# Patient Record
Sex: FEMALE | Race: WHITE | NOT HISPANIC OR LATINO | Employment: UNEMPLOYED | URBAN - METROPOLITAN AREA
[De-identification: names, ages, dates, MRNs, and addresses within clinical notes are randomized per-mention and may not be internally consistent; named-entity substitution may affect disease eponyms.]

---

## 2024-10-23 ENCOUNTER — APPOINTMENT (OUTPATIENT)
Dept: OBSTETRICS AND GYNECOLOGY | Facility: CLINIC | Age: 49
End: 2024-10-23
Payer: COMMERCIAL

## 2024-10-23 VITALS
SYSTOLIC BLOOD PRESSURE: 113 MMHG | DIASTOLIC BLOOD PRESSURE: 79 MMHG | BODY MASS INDEX: 23.66 KG/M2 | WEIGHT: 147.2 LBS | HEIGHT: 66 IN

## 2024-10-23 DIAGNOSIS — N92.1 BREAKTHROUGH BLEEDING ON BIRTH CONTROL PILLS: Primary | ICD-10-CM

## 2024-10-23 PROCEDURE — 3008F BODY MASS INDEX DOCD: CPT

## 2024-10-23 PROCEDURE — 1036F TOBACCO NON-USER: CPT

## 2024-10-23 PROCEDURE — 99203 OFFICE O/P NEW LOW 30 MIN: CPT

## 2024-10-23 RX ORDER — NORETHINDRONE ACETATE AND ETHINYL ESTRADIOL, ETHINYL ESTRADIOL AND FERROUS FUMARATE 1MG-10(24)
1 KIT ORAL
COMMUNITY
Start: 2024-09-30

## 2024-10-23 NOTE — PROGRESS NOTES
Subjective   Patient ID: Sharron Mixon is a 48 y.o. female who presents for Menstrual Problem.  HPI  Patient here to establish care. Patient was previously getting care through [facility], though reports that she has been unhappy with her care as she felt she was not being fully listened to.     Patient reports last year she began to experience AUB and menorrhagia. Patient had subsequent workup and it was found that she had an endometrial polyp. Patient had d and c and polypectomy with negative pathology results. Once benign finding were noted patient was started on Lo loestrin to control cycles. RX sent 9/30    Patient reported to this office today to address concerns on daily bleeding/spotting since starting OCP three weeks ago.   Review of Systems    Objective   Physical Exam  Constitutional:       Appearance: Normal appearance.   Eyes:      Conjunctiva/sclera: Conjunctivae normal.   Pulmonary:      Effort: Pulmonary effort is normal.   Neurological:      Mental Status: She is alert.   Psychiatric:         Mood and Affect: Mood normal.         Assessment/Plan   Breakthrough bleeding on contraception; discussed with patient that irregular bleeding can occur for the first three months when initiating contraception. Discussed with patient increase risk of VTE with increasing age and estrogen use. Patient made aware that this is not a complete contraindication with MEC of 2.     Discussed with patient other contraceptive options if bleeding does not improve with [facility].     Patient to reach out to this provider if she does not see an improvement in her bleeding after 3 months of Ocp use.          MICHAEL Garzon 10/23/24 5:00 PM

## 2024-10-30 ENCOUNTER — APPOINTMENT (OUTPATIENT)
Dept: PRIMARY CARE | Facility: CLINIC | Age: 49
End: 2024-10-30
Payer: COMMERCIAL

## 2024-10-30 VITALS
DIASTOLIC BLOOD PRESSURE: 60 MMHG | BODY MASS INDEX: 23.63 KG/M2 | HEART RATE: 89 BPM | SYSTOLIC BLOOD PRESSURE: 100 MMHG | HEIGHT: 66 IN | WEIGHT: 147 LBS | OXYGEN SATURATION: 99 %

## 2024-10-30 DIAGNOSIS — Z76.89 ENCOUNTER TO ESTABLISH CARE: ICD-10-CM

## 2024-10-30 DIAGNOSIS — D32.0 INTRACRANIAL MENINGIOMA (MULTI): Primary | ICD-10-CM

## 2024-10-30 DIAGNOSIS — G43.E09 CHRONIC MIGRAINE WITH AURA WITHOUT STATUS MIGRAINOSUS, NOT INTRACTABLE: ICD-10-CM

## 2024-10-30 DIAGNOSIS — N92.6 IRREGULAR MENSTRUAL BLEEDING: ICD-10-CM

## 2024-10-30 DIAGNOSIS — E05.00 GRAVES DISEASE: ICD-10-CM

## 2024-10-30 DIAGNOSIS — R53.83 FATIGUE, UNSPECIFIED TYPE: ICD-10-CM

## 2024-10-30 PROCEDURE — 1036F TOBACCO NON-USER: CPT | Performed by: NURSE PRACTITIONER

## 2024-10-30 PROCEDURE — 99203 OFFICE O/P NEW LOW 30 MIN: CPT | Performed by: NURSE PRACTITIONER

## 2024-10-30 PROCEDURE — 3008F BODY MASS INDEX DOCD: CPT | Performed by: NURSE PRACTITIONER

## 2024-10-30 RX ORDER — METHIMAZOLE 5 MG/1
5 TABLET ORAL DAILY
COMMUNITY

## 2024-10-30 RX ORDER — FEXOFENADINE HCL 60 MG/1
60 TABLET, FILM COATED ORAL DAILY
COMMUNITY

## 2024-10-30 RX ORDER — ONDANSETRON HYDROCHLORIDE 8 MG/1
8 TABLET, FILM COATED ORAL EVERY 8 HOURS PRN
COMMUNITY

## 2024-10-30 RX ORDER — VENLAFAXINE HYDROCHLORIDE 150 MG/1
150 CAPSULE, EXTENDED RELEASE ORAL DAILY
COMMUNITY

## 2024-10-30 RX ORDER — RIZATRIPTAN BENZOATE 10 MG/1
10 TABLET ORAL ONCE AS NEEDED
COMMUNITY

## 2024-10-30 ASSESSMENT — PATIENT HEALTH QUESTIONNAIRE - PHQ9
SUM OF ALL RESPONSES TO PHQ9 QUESTIONS 1 & 2: 0
1. LITTLE INTEREST OR PLEASURE IN DOING THINGS: NOT AT ALL
2. FEELING DOWN, DEPRESSED OR HOPELESS: NOT AT ALL

## 2024-10-30 NOTE — PROGRESS NOTES
Subjective   Patient ID: Sharron Mixon is a 48 y.o. female who presents for Establish Care.    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     Graves disease (1/14/22), h/o primary hyperparathyroidism s/p left inferior parathyroidectomy  5/17/2: Endo Dr Emmett Rivas   Med: MethIMAzole (Tapazole)  Was told to decrease and repeat labs  Discussed fatigue with endo but endo felt related more to perimenopause   Endo Referred [facility] to [facility] 8/24    C/o fatigue:  bad over past 3-4 months   Excessive menstrual bleeding   Just started 2nd month of BC  She is to reach out to gyn if bleeding continues after 3rd month of BC    Past Medical History:   Diagnosis Date    Abnormal uterine bleeding due to endometrial polyp     Personal history of other diseases of the nervous system and sense organs     History of migraine     Past Surgical History:   Procedure Laterality Date    DILATION AND CURETTAGE OF UTERUS      NASAL SEPTUM SURGERY  01/12/2016    Nasal Septal Deviation Repair    PARATHYROIDECTOMY Left 2021    TONSILLECTOMY  01/12/2016    Tonsillectomy     Social History     Socioeconomic History    Marital status:      Spouse name: Not on file    Number of children: Not on file    Years of education: Not on file    Highest education level: Not on file   Occupational History    Not on file   Tobacco Use    Smoking status: Former     Types: Cigarettes    Smokeless tobacco: Never   Substance and Sexual Activity    Alcohol use: Not on file    Drug use: Not on file    Sexual  activity: Not on file   Other Topics Concern    Not on file   Social History Narrative    Not on file     Social Drivers of Health     Financial Resource Strain: Low Risk  (9/21/2021)    Received from [facility]    Overall Financial Resource Strain (CARDIA)     Difficulty of Paying Living Expenses: Not hard at all   Food Insecurity: No Food Insecurity (9/21/2021)    Received from [facility]    Hunger Vital Sign     Worried About Running Out of Food in the Last Year: Never true     Ran Out of Food in the Last Year: Never true   Transportation Needs: No Transportation Needs (9/21/2021)    Received from [facility]    PRAPARE - Transportation     Lack of Transportation (Medical): No     Lack of Transportation (Non-Medical): No   Physical Activity: Sufficiently Active (9/21/2021)    Received from [facility]    Exercise Vital Sign     Days of Exercise per Week: 5 days     Minutes of Exercise per Session: 50 min   Stress: No Stress Concern Present (9/21/2021)    Received from [facility]    [employer] - Occupational Stress Questionnaire     Feeling of Stress : Not at all   Social Connections: Unknown (9/21/2021)    Received from [facility]    Social Connection and Isolation Panel [NHANES]     Frequency of Communication with Friends and Family: Patient declined     Frequency of Social Gatherings with Friends and Family: Patient declined     Attends Nondenominational Services: Never     Active Member of Clubs or Organizations: Patient declined     Attends Club or Organization Meetings: Patient declined     Marital Status:    Intimate Partner Violence: Not on file   Housing Stability: Low Risk  (3/26/2022)    Received from [facility]    Housing Stability Vital Sign     Unable to Pay for Housing in the Last Year: No     Number of Places Lived in the Last Year: 1     Unstable Housing in the Last Year: No       Review of Systems    Objective   /60    "Pulse 89   Ht 1.676 m (5' 6\")   Wt 66.7 kg (147 lb)   LMP 09/25/2024 (Exact Date)   SpO2 99%   BMI 23.73 kg/m²     Physical Exam    Alert and oriented x 3  Appears healthy  Does not appear/sound dyspneic with conversation  Speech clear.  Hearing adequate.  Psych: Normal affect. Good judgment and insight.       Assessment/Plan     1. Encounter to establish care  Past Medical History:   Diagnosis Date    Abnormal uterine bleeding due to endometrial polyp     Personal history of other diseases of the nervous system and sense organs     History of migraine         2. Intracranial meningioma (Multi) (Primary)  Follow-up with specialist per their discretion/direction.     3. Chronic migraine with aura without status migrainosus, not intractable  Follow-up with specialist per their discretion/direction.     4. Graves disease  Follow-up with specialist per their discretion/direction.     5. Fatigue, unspecified   [facility] labs reviewed and within normal ranges     6. Irregular menstrual bleeding:   Discuss irregular bleeding with gyn after 3rd month of being on OBC  Discuss concerns of perimenopause with gyn       Medications refills will be completed as discussed.     Any labs or testing that is ordered will be reviewed and the results will be in your chart .   You can review these via  [employer].     Prescriptions will not be filled unless you are compliant with your follow-up appointments or have a follow-up appointment scheduled as per the instruction of your provider. Refills for medications should be requested at the time of your office visit.     Please allow one week for refill requests to be completed.     Contact office with any questions or concerns.   Preferred communication is via  [employer]      Call  Services: [phone] to assist with scheduling.      Abbie Fay APRALIA-[facility]  [address], [address]  [place] [address]  Phone: " [phone]    **Charting was completed using voice recognition technology and may include unintended errors**

## 2024-11-04 PROBLEM — R53.83 FATIGUE: Status: ACTIVE | Noted: 2024-11-04

## 2024-11-04 PROBLEM — E05.00 GRAVES DISEASE: Status: ACTIVE | Noted: 2024-11-04

## 2024-11-04 PROBLEM — G43.E09 CHRONIC MIGRAINE WITH AURA WITHOUT STATUS MIGRAINOSUS, NOT INTRACTABLE: Status: ACTIVE | Noted: 2024-11-04

## 2024-11-04 PROBLEM — Z76.89 ENCOUNTER TO ESTABLISH CARE: Status: ACTIVE | Noted: 2024-11-04

## 2024-11-04 PROBLEM — D32.0 INTRACRANIAL MENINGIOMA (MULTI): Status: ACTIVE | Noted: 2024-11-04

## 2024-11-04 PROBLEM — N92.6 IRREGULAR MENSTRUAL BLEEDING: Status: ACTIVE | Noted: 2024-11-04

## 2024-12-16 DIAGNOSIS — Z30.41 ENCOUNTER FOR SURVEILLANCE OF CONTRACEPTIVE PILLS: Primary | ICD-10-CM

## 2024-12-16 RX ORDER — NORETHINDRONE ACETATE AND ETHINYL ESTRADIOL AND FERROUS FUMARATE 1MG-20(24)
1 KIT ORAL DAILY
Qty: 84 TABLET | Refills: 2 | Status: SHIPPED | OUTPATIENT
Start: 2024-12-16 | End: 2025-12-16

## 2024-12-16 NOTE — PROGRESS NOTES
Patient reached out to this provider for concerns of breakthrough bleeding on Lo-Loestrin. Patient placed on OCPS in September after benign polypectomy.     Offered patient up-titrating dose to improve bleeding pattern and patient agreeable. RX for Junel send to patient's pharmacy.     Will consider repeat pelvic US if does change does not improve bleeding patterns.     Tracey Orantes, CONSTANTINE-CNM

## 2025-01-15 ENCOUNTER — APPOINTMENT (OUTPATIENT)
Dept: OBSTETRICS AND GYNECOLOGY | Facility: CLINIC | Age: 50
End: 2025-01-15
Payer: COMMERCIAL

## 2025-01-15 VITALS
SYSTOLIC BLOOD PRESSURE: 116 MMHG | DIASTOLIC BLOOD PRESSURE: 73 MMHG | HEIGHT: 66 IN | BODY MASS INDEX: 23.98 KG/M2 | WEIGHT: 149.2 LBS

## 2025-01-15 DIAGNOSIS — Z01.419 WELL WOMAN EXAM WITH ROUTINE GYNECOLOGICAL EXAM: ICD-10-CM

## 2025-01-15 DIAGNOSIS — Z30.41 ENCOUNTER FOR SURVEILLANCE OF CONTRACEPTIVE PILLS: ICD-10-CM

## 2025-01-15 DIAGNOSIS — Z12.31 SCREENING MAMMOGRAM FOR BREAST CANCER: ICD-10-CM

## 2025-01-15 PROCEDURE — 99396 PREV VISIT EST AGE 40-64: CPT

## 2025-01-15 PROCEDURE — 3008F BODY MASS INDEX DOCD: CPT

## 2025-01-15 PROCEDURE — 87624 HPV HI-RISK TYP POOLED RSLT: CPT

## 2025-01-15 PROCEDURE — 1036F TOBACCO NON-USER: CPT

## 2025-01-15 RX ORDER — NORETHINDRONE ACETATE AND ETHINYL ESTRADIOL AND FERROUS FUMARATE 1MG-20(24)
KIT ORAL
Qty: 84 TABLET | Refills: 4 | Status: SHIPPED | OUTPATIENT
Start: 2025-01-15

## 2025-01-15 ASSESSMENT — PATIENT HEALTH QUESTIONNAIRE - PHQ9
2. FEELING DOWN, DEPRESSED OR HOPELESS: NOT AT ALL
SUM OF ALL RESPONSES TO PHQ9 QUESTIONS 1 & 2: 0
1. LITTLE INTEREST OR PLEASURE IN DOING THINGS: NOT AT ALL

## 2025-01-15 NOTE — PROGRESS NOTES
Subjective   Sharron Mixon is a 49 y.o. female who is here for a routine exam. No vaginal concerns at this time including abnormal discharge, odor or discomfort. She is sexually active with one partner and has no concern for STI exposure. She has no pain or bleeding with sex. She denies bladder or bowel concerns.      Current contraception: OCP (estrogen/progesterone) patient reports continued breakthrough bleeding with contraception. Patient was previously experiencing breakthrough bleeding daily and since changing to Junel 6 weeks ago patient reports spotting with wiping approximately 4 days per week. Patient is on ocps for management of adenomyosis and hormonal migraines  LMP: Takes pill continuously   Last pap: 05/25/22 [facility] hpv-  History of abnormal Pap smear: yes -    Due for pap: no  Family history of uterine or ovarian cancer: no  Family history of prostate cancer: no  Family history of pancreatic cancer: no  Family hx of BRCA1/BRCA2: no  Family history of breast cancer: yes - Maternal Grandmother - 70's  Last mammogram: 08/11/2023 neg  Gardasil: Yes      OB History    No obstetric history on file.         Review of Systems    Objective   There were no vitals taken for this visit.    Physical Exam  HENT:      Mouth/Throat:      Mouth: Mucous membranes are moist.   Eyes:      Conjunctiva/sclera: Conjunctivae normal.   Neck:      Thyroid: No thyroid mass, thyromegaly or thyroid tenderness.   Cardiovascular:      Rate and Rhythm: Normal rate and regular rhythm.      Pulses: Normal pulses.   Pulmonary:      Effort: Pulmonary effort is normal.      Breath sounds: Normal breath sounds.   Chest:   Breasts:     Breasts are symmetrical.      Right: Normal.      Left: Normal.   Abdominal:      General: Abdomen is flat.      Palpations: Abdomen is soft.      Hernia: There is no hernia in the left inguinal area or right inguinal area.   Genitourinary:     General: Normal vulva.      Vagina: Normal.      Cervix: Normal.       Uterus: Normal.       Adnexa: Right adnexa normal and left adnexa normal.      Comments: External cervical os dilated  Musculoskeletal:         General: Normal range of motion.      Cervical back: Normal range of motion.   Lymphadenopathy:      Cervical: No cervical adenopathy.      Right cervical: No superficial, deep or posterior cervical adenopathy.     Left cervical: No superficial, deep or posterior cervical adenopathy.      Lower Body: No right inguinal adenopathy. No left inguinal adenopathy.   Skin:     General: Skin is warm.      Capillary Refill: Capillary refill takes less than 2 seconds.   Neurological:      Mental Status: She is alert and oriented to person, place, and time.   Psychiatric:         Mood and Affect: Mood normal.         Behavior: Behavior normal.          Assessment/Plan   A&P --> 49 y.o. y.o woman for annual GYN exam.     - Health Maintenance -->  - Routine follow up with PCP for health maintenance examination encouraged, including TSH, cholesterol, and Vit. D evaluation.  Self breast awareness encouraged; concerning characteristics of breasts reviewed - Pt. will report general concerns, any adherent lumps, skin dimpling/puckering or color changes, and any nipple discharge. Screening mammogram order placed    Diet and exercise reviewed.     Pap today and if wnl next pap 5 years- Reviewed ACOG and ASCCP guidelines with pt.        - STD Screening: declines screening     - Contraception Plan: OCP (estrogen/progesterone). Discussed with patient increased risk of VTE with increasing age and estrogen use. Patient aware of risk and would like to continue use as OCPs are helpful with her hormonal migraines.     - Continued break through bleeding on OCPs.  Hx of adneomyosis. Patient looking for definitive treatment. Will refer patient to Dr. Armas for continued management      - F/U 1 year or as needed.    CONSTANTINE Garzon-SUZY WHITT MA

## 2025-01-29 LAB
CYTOLOGY CMNT CVX/VAG CYTO-IMP: NORMAL
HPV HR 12 DNA GENITAL QL NAA+PROBE: NEGATIVE
HPV HR GENOTYPES PNL CVX NAA+PROBE: NEGATIVE
HPV16 DNA SPEC QL NAA+PROBE: NEGATIVE
HPV18 DNA SPEC QL NAA+PROBE: NEGATIVE
LAB AP HPV GENOTYPE QUESTION: YES
LAB AP HPV HR: NORMAL
LABORATORY COMMENT REPORT: NORMAL
PATH REPORT.TOTAL CANCER: NORMAL

## 2025-01-31 ENCOUNTER — HOSPITAL ENCOUNTER (OUTPATIENT)
Dept: RADIOLOGY | Facility: HOSPITAL | Age: 50
Discharge: HOME | End: 2025-01-31
Payer: COMMERCIAL

## 2025-01-31 VITALS — BODY MASS INDEX: 25.58 KG/M2 | WEIGHT: 159.2 LBS | HEIGHT: 66 IN

## 2025-01-31 DIAGNOSIS — Z12.31 SCREENING MAMMOGRAM FOR BREAST CANCER: ICD-10-CM

## 2025-01-31 PROCEDURE — 77067 SCR MAMMO BI INCL CAD: CPT

## 2025-02-04 DIAGNOSIS — N92.1 BREAKTHROUGH BLEEDING ON BIRTH CONTROL PILLS: Primary | ICD-10-CM

## 2025-02-04 RX ORDER — NORETHINDRONE 5 MG/1
5 TABLET ORAL DAILY
Qty: 30 TABLET | Refills: 11 | Status: SHIPPED | OUTPATIENT
Start: 2025-02-04 | End: 2026-02-04

## 2025-02-04 NOTE — PROGRESS NOTES
Spoke with patient again in regards to increased VTE risk with OCPs. Patient agreeable to switching to POP at this time and rx sent to patient's pharmacy.     Tracey Orantes, CONSTANTINE-SUZY

## 2025-02-05 ENCOUNTER — APPOINTMENT (OUTPATIENT)
Dept: OBSTETRICS AND GYNECOLOGY | Facility: CLINIC | Age: 50
End: 2025-02-05
Payer: COMMERCIAL

## 2025-02-06 ENCOUNTER — HOSPITAL ENCOUNTER (OUTPATIENT)
Dept: RADIOLOGY | Facility: EXTERNAL LOCATION | Age: 50
Discharge: HOME | End: 2025-02-06

## 2025-02-10 ENCOUNTER — APPOINTMENT (OUTPATIENT)
Dept: OBSTETRICS AND GYNECOLOGY | Facility: CLINIC | Age: 50
End: 2025-02-10
Payer: COMMERCIAL

## 2025-06-16 ENCOUNTER — SPECIALTY PHARMACY (OUTPATIENT)
Dept: PHARMACY | Facility: CLINIC | Age: 50
End: 2025-06-16

## 2025-06-16 ENCOUNTER — TELEMEDICINE (OUTPATIENT)
Dept: NEUROLOGY | Facility: CLINIC | Age: 50
End: 2025-06-16
Payer: COMMERCIAL

## 2025-06-16 DIAGNOSIS — R51.9 CHRONIC DAILY HEADACHE: ICD-10-CM

## 2025-06-16 DIAGNOSIS — G43.009 MIGRAINE WITHOUT AURA AND WITHOUT STATUS MIGRAINOSUS, NOT INTRACTABLE: Primary | ICD-10-CM

## 2025-06-16 PROCEDURE — 99204 OFFICE O/P NEW MOD 45 MIN: CPT | Performed by: NURSE PRACTITIONER

## 2025-06-16 RX ORDER — ATOGEPANT 30 MG/1
30 TABLET ORAL NIGHTLY
Qty: 30 TABLET | Refills: 2 | Status: SHIPPED | OUTPATIENT
Start: 2025-06-16

## 2025-06-16 NOTE — ASSESSMENT & PLAN NOTE
Orders:    atogepant (Qulipta) 30 mg tablet; Take 30 mg by mouth once daily at bedtime.    Follow Up In Neurology; Future

## 2025-06-16 NOTE — PROGRESS NOTES
Virtual or Telephone Consent    An interactive audio and video telecommunication system which permits real time communications between the patient (at the originating site) and provider (at the distant site) was utilized to provide this telehealth service.   Verbal consent was requested and obtained from Sharron Mixon on this date, 06/16/25 for a telehealth visit and the patient's location was confirmed at the time of the visit.    Subjective   HPI  Sharron Mixon is a 49 y.o. year old female who presents with chief complaint Migraine without aura and without status migrainosus, not intractable [G43.009]    Sharron started getting headaches at age 23.  When diagnosed.  Had HA as child as well.  Currently experiencing 30 HA days per month.  Has been patient with [facility] and is looking for second opinion.  Is currently receiving Botox treatment through [facility].  Thus far has not seen a significant improvement in migraine activity.  Triggers include menses and aggressive movements of neck, odors.  The headaches are usually moderate, dull, and pressure, tightness and are located base of skull, generally symmetric.   Generally, headaches are constant in duration.  Vary in intensity.   Associated photophobia, phonophobia, nausea, vomiting, and vestibular symptoms, brain fog, and fatigue.  The headaches are not positional.   Headaches are worsened with exertion. No change in character with sneezing, coughing and bending over.   The current rescue medication Rizatriptan (Maxalt) starts to take effect within 1    hours and decreases the headache by 100%. Next day will wake up with another one, or by mid day of the following day will have another one.  Work attendance or other daily activities affected:  yes  Sleep:  8-9 hours per night  Caffeine:  1 can soda per day  Head or neck injuries/MVC:  denies   Previous head CT/ brain MRI?  Reviewed brain MRI 9/15/23 and C-spine MRI 4/23/24  Recent labs?  Reviewed  Endorses regular vision  checkups.  Endorses regular dental checkups.  The patient denies the presence of any associated double vision, speech problems, confusion, problems with coordination. Denies other neurological history of seizures, stroke, or TIA.    Medications  Current & Past Treatments:  Preventive:  Gabapentin  Topiramate  Amitriptyline  Venlafaxine  Nortriptyline  Duloxetine  Nadalol  Vyepti  Ajovy  Emgality  Nurtec  Botox      Rescue:  Sumatriptan  Zolmitriptan  Rizatriptan  Amerge  Axert  Nurtec    Current Medications[1]  RX Allergies[2]  Medical History[3]  Surgical History[4]  Family History[5]  Social History     Tobacco Use    Smoking status: Former     Types: Cigarettes    Smokeless tobacco: Never   Substance Use Topics    Alcohol use: Yes     Social History     Substance and Sexual Activity   Drug Use Never        ROS  As noted in HPI, otherwise all other systems have been reviewed are negative for complaint.     Objective   General Appearance: Sharron is well-developed, well-nourished, 49 y.o. year old female, in no acute distress. Makes good eye contact, is alert, interactive, and cooperative. Demonstrates recent & remote memory recall. Subjective information consistent with objective assessment.       Lab Results   Component Value Date    HGBA1C 5.5 04/18/2025        Neurological Exam  CRANIAL NERVES:   CN VII: Full and symmetric facial movement.  CN VIII: Hearing is normal.      RECORDS REVIEW:  Previous records (provider notes, laboratory reports, and imaging studies were reviewed and summarized).  No MRI head results found for the past 12 months     No CT head results found for the past 12 months    No CT Angio Head Results found for the past 14 days     No CT Angio Neck Results found for the past  year     Assessment & Plan  Migraine without aura and without status migrainosus, not intractable    Orders:    atogepant (Qulipta) 30 mg tablet; Take 30 mg by mouth once daily at bedtime.    Follow Up In Neurology;  Future    Chronic daily headache              ASSESSMENT/PLAN:  Start Qulipta 30 mg for preventative treatment of migraine.  Okay to continue Botox treatments.  Okay to continue rizatriptan 10 mg for abortive treatment of migraine.  Follow-up in 2 to 3 months.        Cinda Cardoza, APRN-CNP          [1]   Current Outpatient Medications:     fexofenadine (Allegra) 60 mg tablet, Take 1 tablet (60 mg) by mouth once daily., Disp: , Rfl:     methIMAzole (Tapazole) 5 mg tablet, Take 1 tablet (5 mg) by mouth once daily., Disp: , Rfl:     norethindrone (Aygestin) 5 mg tablet, Take 1 tablet (5 mg) by mouth once daily., Disp: 30 tablet, Rfl: 11    norethindrone-e.estradioL-iron (Junel Fe 24) 1 mg-20 mcg (24)/75 mg (4) tablet, Take one tablet by mouth daily and skip placebo pills., Disp: 84 tablet, Rfl: 4    ondansetron (Zofran) 8 mg tablet, Take 1 tablet (8 mg) by mouth every 8 hours if needed for nausea or vomiting., Disp: , Rfl:     rizatriptan (Maxalt) 10 mg tablet, Take 1 tablet (10 mg) by mouth 1 time if needed for migraine. May repeat in 2 hours if unresolved. Do not exceed 30 mg in 24 hours., Disp: , Rfl:     venlafaxine XR (Effexor-XR) 150 mg 24 hr capsule, Take 1 capsule (150 mg) by mouth once daily. Do not crush or chew., Disp: , Rfl:   [2] No Known Allergies  [3]   Past Medical History:  Diagnosis Date    Abnormal uterine bleeding due to endometrial polyp     Personal history of other diseases of the nervous system and sense organs     History of migraine   [4]   Past Surgical History:  Procedure Laterality Date    DILATION AND CURETTAGE OF UTERUS      NASAL SEPTUM SURGERY  01/12/2016    Nasal Septal Deviation Repair    PARATHYROIDECTOMY Left 2021    TONSILLECTOMY  01/12/2016    Tonsillectomy   [5]   Family History  Problem Relation Name Age of Onset    Hypothyroidism Mother      Sarcoidosis Mother      BRYAN disease Father      No Known Problems Brother

## 2025-06-27 ENCOUNTER — SPECIALTY PHARMACY (OUTPATIENT)
Dept: PHARMACY | Facility: CLINIC | Age: 50
End: 2025-06-27

## 2025-06-27 PROCEDURE — RXMED WILLOW AMBULATORY MEDICATION CHARGE

## 2025-06-30 ENCOUNTER — PHARMACY VISIT (OUTPATIENT)
Dept: PHARMACY | Facility: CLINIC | Age: 50
End: 2025-06-30
Payer: COMMERCIAL

## 2025-07-14 DIAGNOSIS — G43.E09 CHRONIC MIGRAINE WITH AURA WITHOUT STATUS MIGRAINOSUS, NOT INTRACTABLE: Primary | ICD-10-CM

## 2025-07-14 RX ORDER — METHYLPREDNISOLONE 4 MG/1
TABLET ORAL
Qty: 21 TABLET | Refills: 0 | Status: SHIPPED | OUTPATIENT
Start: 2025-07-14 | End: 2025-07-20

## 2025-07-23 PROCEDURE — RXMED WILLOW AMBULATORY MEDICATION CHARGE

## 2025-07-24 ENCOUNTER — APPOINTMENT (OUTPATIENT)
Dept: NEUROLOGY | Facility: CLINIC | Age: 50
End: 2025-07-24
Payer: COMMERCIAL

## 2025-07-29 ENCOUNTER — SPECIALTY PHARMACY (OUTPATIENT)
Dept: PHARMACY | Facility: CLINIC | Age: 50
End: 2025-07-29

## 2025-07-30 ENCOUNTER — PHARMACY VISIT (OUTPATIENT)
Dept: PHARMACY | Facility: CLINIC | Age: 50
End: 2025-07-30
Payer: COMMERCIAL

## 2025-08-14 ASSESSMENT — ENCOUNTER SYMPTOMS
DIARRHEA: 0
ABDOMINAL PAIN: 0
COUGH: 0
RHINORRHEA: 0
HEADACHES: 1
COUGH: 0
NECK PAIN: 1
DIARRHEA: 0
SORE THROAT: 0
RHINORRHEA: 0
HEADACHES: 1
ABDOMINAL PAIN: 0
SORE THROAT: 0
NECK PAIN: 1
VOMITING: 0
VOMITING: 0

## 2025-08-21 ENCOUNTER — CLINICAL SUPPORT (OUTPATIENT)
Dept: AUDIOLOGY | Facility: CLINIC | Age: 50
End: 2025-08-21
Payer: COMMERCIAL

## 2025-08-21 ENCOUNTER — APPOINTMENT (OUTPATIENT)
Facility: CLINIC | Age: 50
End: 2025-08-21
Payer: COMMERCIAL

## 2025-08-21 VITALS
DIASTOLIC BLOOD PRESSURE: 68 MMHG | OXYGEN SATURATION: 98 % | SYSTOLIC BLOOD PRESSURE: 104 MMHG | BODY MASS INDEX: 23.78 KG/M2 | HEIGHT: 66 IN | HEART RATE: 89 BPM | WEIGHT: 148 LBS

## 2025-08-21 DIAGNOSIS — H93.13: Primary | ICD-10-CM

## 2025-08-21 DIAGNOSIS — Z01.10 NORMAL HEARING EXAM: Primary | ICD-10-CM

## 2025-08-21 DIAGNOSIS — H69.93 DYSFUNCTION OF BOTH EUSTACHIAN TUBES: ICD-10-CM

## 2025-08-21 PROCEDURE — 92550 TYMPANOMETRY & REFLEX THRESH: CPT | Mod: 52

## 2025-08-21 PROCEDURE — 99204 OFFICE O/P NEW MOD 45 MIN: CPT | Performed by: OTOLARYNGOLOGY

## 2025-08-21 PROCEDURE — 92557 COMPREHENSIVE HEARING TEST: CPT

## 2025-08-21 PROCEDURE — 3008F BODY MASS INDEX DOCD: CPT | Performed by: OTOLARYNGOLOGY

## 2025-08-29 ENCOUNTER — SPECIALTY PHARMACY (OUTPATIENT)
Dept: PHARMACY | Facility: CLINIC | Age: 50
End: 2025-08-29

## 2025-08-29 ENCOUNTER — PHARMACY VISIT (OUTPATIENT)
Dept: PHARMACY | Facility: CLINIC | Age: 50
End: 2025-08-29
Payer: COMMERCIAL

## 2025-08-29 PROCEDURE — RXMED WILLOW AMBULATORY MEDICATION CHARGE

## 2025-10-02 ENCOUNTER — APPOINTMENT (OUTPATIENT)
Facility: CLINIC | Age: 50
End: 2025-10-02
Payer: COMMERCIAL